# Patient Record
(demographics unavailable — no encounter records)

---

## 2024-12-10 NOTE — HISTORY OF PRESENT ILLNESS
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

## 2024-12-10 NOTE — ASSESSMENT
[FreeTextEntry1] : UTI - checking culture -  f/u urinalysis  Memory - continue to monitor - adjust current medications in response to behavioral changes   Bilateral foot arthritis Recommending follow-up with podiatry

## 2025-01-31 NOTE — PHYSICAL EXAM
[No Acute Distress] : no acute distress [Well Nourished] : well nourished [Well Developed] : well developed [Normal Outer Ear/Nose] : the outer ears and nose were normal in appearance [No JVD] : no jugular venous distention [No Respiratory Distress] : no respiratory distress  [No Accessory Muscle Use] : no accessory muscle use [Normal Rate] : normal rate  [Regular Rhythm] : with a regular rhythm [Normal S1, S2] : normal S1 and S2 [No Edema] : there was no peripheral edema [Non-distended] : non-distended [No CVA Tenderness] : no CVA  tenderness [No Joint Swelling] : no joint swelling [No Rash] : no rash [Normal Gait] : normal gait [de-identified] : hx dementia

## 2025-01-31 NOTE — HISTORY OF PRESENT ILLNESS
[FreeTextEntry1] : Follow-up for discharge from [facility] for PNA.  [de-identified] : Patient is a 89 year old female enrolled in the [facility] program with a history of  HTN, Dementia. Patient was recently discharged on 1/17/25 from [facility] for PNA.    Hospital chart reviewed and copied as per [facility] Discharge Summary: "88 yo F with PMH HTN, Dementia presenting with N/V/D and abdominal pain. Patient reports that around 11 PM last night she started having nausea and vomiting.  Since then has not been able to tolerate any p.o.  Daughter says that she was having roughly 1 episode of vomiting per hour and endorsing some abdominal pain which started after the bouts of vomiting. She also had a bowel movement today which was watery. However, no further episodes of diarrhea. Patient's daughter states that she was at an adult  yesterday.  No fevers at home.  Patient denies chest pain, shortness of breath, back pain, dysuria or hematuria.  No hematochezia, melena, hematemesis.  Of note, daughter states patient had a productive cough and URI symptoms about 2 weeks ago, for which she used vicks vapor rub on her chest with some relief. No other ill contacts  In the ED patient hemodynamically stable but hypertensive. CBC with leucocytosis 19k. BMP unremarkable. Lactate 2.8 but downtrended. UA Dirty. CTAP without acute intraabdominal process. CT Chest showing multifocal pneumonia. Pt received Ceftriaxone and azithromycin  x 1 as well as zofran.   Patient observed via home visit and is alert and oriented x 1-2, in no acute distress. Patient observed sitting comfortably upright in dinning room chair with daughter present at time of visit. Patient states they are feeling well today. Daughter states the patient is eating and drinking well. Daughter reports patient has been compliant with medications and has completed her antibiotics. Patient scheduled for f/u with PCP on 2/20/25. Daughter reports patient has hx of recurrent UTI- has concerns for one at this moment due to symptoms of urinary frequency and increased confusions- requesting urine studies to be done. Denies any cough, fever, chills, abdominal pain, palpitations, nausea, vomiting, diarrhea, lightheadedness, dizziness, shortness of breath, or chest pain.

## 2025-01-31 NOTE — PLAN
[FreeTextEntry1] : -CV and pulmonary status stable -Patient advised to continue with medication regimen as directed -Medication education discussed in full detail with + teach back. -Encouraged verbalization of fears and concerns. -Report all symptoms not relieved by rest or medication -Educated on monitoring blood pressure -Maintain Balanced diet -Exercise as appropriate -Patient educated on s/s of when to call medical providers with + teach back. -Reminded of NP role and advised to call with any questions/concerns. -Follow up with medical providers as scheduled  Will order urine studies    - Patient verbalized understanding of plan above, advised to call TCM Team or CCC with any questions or concerns.

## 2025-01-31 NOTE — ASSESSMENT
[FreeTextEntry1] : Patient is a 89 year old female enrolled in the [facility] program with a history of  HTN, Dementia. Patient was recently discharged on 1/17/25 from [facility] for PNA.    Hospital chart reviewed and copied as per [facility] Discharge Summary: "88 yo F with PMH HTN, Dementia presenting with N/V/D and abdominal pain. Patient reports that around 11 PM last night she started having nausea and vomiting.  Since then has not been able to tolerate any p.o.  Daughter says that she was having roughly 1 episode of vomiting per hour and endorsing some abdominal pain which started after the bouts of vomiting. She also had a bowel movement today which was watery. However, no further episodes of diarrhea. Patient's daughter states that she was at an adult  yesterday.  No fevers at home.  Patient denies chest pain, shortness of breath, back pain, dysuria or hematuria.  No hematochezia, melena, hematemesis.  Of note, daughter states patient had a productive cough and URI symptoms about 2 weeks ago, for which she used vicks vapor rub on her chest with some relief. No other ill contacts  In the ED patient hemodynamically stable but hypertensive. CBC with leucocytosis 19k. BMP unremarkable. Lactate 2.8 but downtrended. UA Dirty. CTAP without acute intraabdominal process. CT Chest showing multifocal pneumonia. Pt received Ceftriaxone and azithromycin  x 1 as well as zofran.   Patient observed via home visit and is alert and oriented x 1-2, in no acute distress. Patient observed sitting comfortably upright in dinning room chair with daughter present at time of visit. Patient states they are feeling well today. Daughter states the patient is eating and drinking well. Daughter reports patient has been compliant with medications and has completed her antibiotics. Patient scheduled for f/u with PCP on 2/20/25. Daughter reports patient has hx of recurrent UTI- has concerns for one at this moment due to symptoms of urinary frequency and increased confusions- requesting urine studies to be done. Denies any cough, fever, chills, abdominal pain, palpitations, nausea, vomiting, diarrhea, lightheadedness, dizziness, shortness of breath, or chest pain.

## 2025-01-31 NOTE — REVIEW OF SYSTEMS
[Fever] : no fever [Chills] : no chills [Fatigue] : no fatigue [Night Sweats] : no night sweats [Chest Pain] : no chest pain [Palpitations] : no palpitations [Leg Claudication] : no leg claudication [Lower Ext Edema] : no lower extremity edema [Orthopnea] : no orthopnea [Shortness Of Breath] : no shortness of breath [Wheezing] : no wheezing [Cough] : no cough [Dyspnea on Exertion] : no dyspnea on exertion [Abdominal Pain] : no abdominal pain [Nausea] : no nausea [Vomiting] : no vomiting [Dysuria] : no dysuria [Hematuria] : no hematuria [Frequency] : frequency [Negative] : Heme/Lymph [de-identified] : hx dementia

## 2025-02-23 NOTE — ASSESSMENT
[FreeTextEntry1] :  HTN - stable well controlled - continue current management - counseled on AHA guidelines for guidance regarding exercise (30-40min 3 times a week - recommend low salt diet - recommend medication compliance   pneumonia  - follow up pneumonia

## 2025-02-23 NOTE — HISTORY OF PRESENT ILLNESS
[Post-hospitalization from ___ Hospital] : Post-hospitalization from [unfilled] Hospital [Admitted on: ___] : The patient was admitted on [unfilled] [Discharged on ___] : discharged on [unfilled] [Discharge Summary] : discharge summary [Pertinent Labs] : pertinent labs [Radiology Findings] : radiology findings [Discharge Med List] : discharge medication list [Med Reconciliation] : medication reconciliation has been completed [Patient Contacted By: ____] : and contacted by [unfilled] [FreeTextEntry2] : 89-year-old female with a history of hyperlipidemia hypertension dementia, presents today for postdischarge due to multifocal pneumonia Patient presented to the hospital with multiple episodes of vomiting She had an evaluation and was found to have multifocal pneumonia treated with azithromycin and ceftriaxone The patient also was noted to have asymptomatic bacteriuria The patient recovered and was sent home Of note she had lactic acidosis secondary to sepsis

## 2025-06-02 NOTE — ASSESSMENT
[FreeTextEntry1] :  HTN - stable well controlled - continue current management - counseled on AHA guidelines for guidance regarding exercise (30-40min 3 times a week - recommend low salt diet - recommend medication compliance    COVID infection Resolved Doing well Recommended normal COVID vaccination schedule

## 2025-06-02 NOTE — HISTORY OF PRESENT ILLNESS
[Post-hospitalization from ___ Hospital] : Post-hospitalization from [unfilled] Hospital [Admitted on: ___] : The patient was admitted on [unfilled] [Discharged on ___] : discharged on [unfilled] [Discharge Summary] : discharge summary [Pertinent Labs] : pertinent labs [Radiology Findings] : radiology findings [Discharge Med List] : discharge medication list [Med Reconciliation] : medication reconciliation has been completed [Patient Contacted By: ____] : and contacted by [unfilled] [FreeTextEntry2] : 90-year-old female with a history of depression hypertension hyperlipidemia presents today for follow-up patient was seen in the hospital for admission related to persistent fever The patient was having nausea vomiting She was extremely fatigued She was admitted for possible pneumonia she was started on antibiotics However it was found that she was COVID-positive and the workup for pneumonia was negative after review and antibiotics were stopped The patient improved with fluid intake She was sent home on conservative management and Zofran Since then the patient has had a full recovery she has been feeling well overall Importantly she was stopped from taking losartan This was due to kidney dysfunction Since then she is not taking the medication her blood pressure has been normal At this time the recommendation is to monitor her blood pressure

## 2025-06-26 NOTE — PHYSICAL EXAM
[Normal] : no respiratory distress, lungs were clear to auscultation bilaterally and no accessory muscle use [de-identified] : Murmur auscultated

## 2025-06-26 NOTE — HISTORY OF PRESENT ILLNESS
[FreeTextEntry8] : 90-year-old female with history of hyperlipidemia, vitamin B12 vitamin D hypertension, presents today for acute exam Patient presents with multiple episodes of vomiting last night The patient last night was noted to have multiple episodes at 4 AM to 6 AM last occurrence at 8 AM Nonbilious nonbloody vomiting Not associated with fever, diarrhea, respiratory symptoms, urinary symptoms, abdominal pain She did go to a elderly day center She has meals there and there may have been a meal that did not sit well with her Since 8 AM today she has not had another recurrence of vomiting She has eaten some apples and has not vomited She was given an over-the-counter antinausea medication this morning

## 2025-06-26 NOTE — ASSESSMENT
[FreeTextEntry1] : Nausea vomiting Unclear etiology No red flag symptoms at this visit Given the presentation patient appears stable hemodynamically intact Recommending that she continue hydration May use the antinausea medications for today but that should stop Patient should return in the event of any fever, blood, significant diarrhea, abdominal pain Likely a mild gastroenteritis or GERD as a result of something she ate   HTN - stable well controlled - continue current management - counseled on AHA guidelines for guidance regarding exercise (30-40min 3 times a week - recommend low salt diet - recommend medication compliance